# Patient Record
Sex: MALE | Race: WHITE | Employment: FULL TIME | RURAL
[De-identification: names, ages, dates, MRNs, and addresses within clinical notes are randomized per-mention and may not be internally consistent; named-entity substitution may affect disease eponyms.]

---

## 2021-06-30 ENCOUNTER — HOSPITAL ENCOUNTER (EMERGENCY)
Age: 41
Discharge: HOME | End: 2021-06-30
Attending: EMERGENCY MEDICINE
Payer: MEDICAID

## 2021-06-30 VITALS
RESPIRATION RATE: 20 BRPM | HEIGHT: 67 IN | DIASTOLIC BLOOD PRESSURE: 79 MMHG | HEART RATE: 67 BPM | TEMPERATURE: 97.9 F | SYSTOLIC BLOOD PRESSURE: 140 MMHG | WEIGHT: 176 LBS | OXYGEN SATURATION: 98 % | BODY MASS INDEX: 27.62 KG/M2

## 2021-06-30 DIAGNOSIS — S05.01XA ABRASION OF RIGHT CORNEA, INITIAL ENCOUNTER: ICD-10-CM

## 2021-06-30 DIAGNOSIS — S01.81XA FACIAL LACERATION, INITIAL ENCOUNTER: Primary | ICD-10-CM

## 2021-06-30 PROCEDURE — 75810000294 HC INTERM/LAYERED WND RPR

## 2021-06-30 PROCEDURE — 90471 IMMUNIZATION ADMIN: CPT

## 2021-06-30 PROCEDURE — 77030002916 HC SUT ETHLN J&J -A

## 2021-06-30 PROCEDURE — 74011000250 HC RX REV CODE- 250: Performed by: EMERGENCY MEDICINE

## 2021-06-30 PROCEDURE — 74011250636 HC RX REV CODE- 250/636: Performed by: EMERGENCY MEDICINE

## 2021-06-30 PROCEDURE — 99283 EMERGENCY DEPT VISIT LOW MDM: CPT

## 2021-06-30 PROCEDURE — 74011250637 HC RX REV CODE- 250/637: Performed by: EMERGENCY MEDICINE

## 2021-06-30 PROCEDURE — 90715 TDAP VACCINE 7 YRS/> IM: CPT | Performed by: EMERGENCY MEDICINE

## 2021-06-30 PROCEDURE — 77030018842 HC SOL IRR SOD CL 9% BAXT -A

## 2021-06-30 RX ORDER — CEPHALEXIN 500 MG/1
500 CAPSULE ORAL 3 TIMES DAILY
Qty: 15 CAPSULE | Refills: 0 | Status: SHIPPED | OUTPATIENT
Start: 2021-06-30

## 2021-06-30 RX ORDER — CIPROFLOXACIN HYDROCHLORIDE 3.5 MG/ML
1 SOLUTION/ DROPS TOPICAL 4 TIMES DAILY
Qty: 2.5 ML | Refills: 0 | Status: SHIPPED | OUTPATIENT
Start: 2021-06-30 | End: 2021-06-30 | Stop reason: SDUPTHER

## 2021-06-30 RX ORDER — HYDROCODONE BITARTRATE AND ACETAMINOPHEN 5; 325 MG/1; MG/1
1 TABLET ORAL
Status: COMPLETED | OUTPATIENT
Start: 2021-06-30 | End: 2021-06-30

## 2021-06-30 RX ORDER — LIDOCAINE HYDROCHLORIDE AND EPINEPHRINE 10; 10 MG/ML; UG/ML
1.5 INJECTION, SOLUTION INFILTRATION; PERINEURAL ONCE
Status: COMPLETED | OUTPATIENT
Start: 2021-06-30 | End: 2021-06-30

## 2021-06-30 RX ORDER — CIPROFLOXACIN HYDROCHLORIDE 3.5 MG/ML
1 SOLUTION/ DROPS TOPICAL 4 TIMES DAILY
Qty: 5 ML | Refills: 0 | Status: SHIPPED | OUTPATIENT
Start: 2021-06-30 | End: 2021-07-07

## 2021-06-30 RX ADMIN — HYDROCODONE BITARTRATE AND ACETAMINOPHEN 1 TABLET: 5; 325 TABLET ORAL at 14:19

## 2021-06-30 RX ADMIN — LIDOCAINE HYDROCHLORIDE AND EPINEPHRINE 15 MG: 10; 10 INJECTION, SOLUTION INFILTRATION; PERINEURAL at 14:19

## 2021-06-30 RX ADMIN — TETANUS TOXOID, REDUCED DIPHTHERIA TOXOID AND ACELLULAR PERTUSSIS VACCINE, ADSORBED 0.5 ML: 5; 2.5; 8; 8; 2.5 SUSPENSION INTRAMUSCULAR at 14:19

## 2021-06-30 RX ADMIN — FLUORESCEIN SODIUM 1 STRIP: 1 STRIP OPHTHALMIC at 14:19

## 2021-06-30 NOTE — ED PROVIDER NOTES
EMERGENCY DEPARTMENT HISTORY AND PHYSICAL EXAM          Date: 6/30/2021  Patient Name: Chad Butler    History of Presenting Illness     Chief Complaint   Patient presents with    Laceration       History Provided By: Patient    HPI: Chad Butler is a 37 y.o. male, without prior history presents ambulatory to the ER complaining of facial injury. He explains he was helping a friend back above boat trailer when the wench broke loose from control circled back and hit him in the right eye and forehead. He had his friend bring him directly to the ER and has not taken anything for his symptoms but is complaining of severe pain. He denies any nausea or vomiting as well as any loss of consciousness but states he did see stars for a couple seconds    PCP: None    Allergies: nkda  Social Hx: -tobacco, -vaping, -EtOH, -Illicit Drugs; There are no other complaints, changes, or physical findings at this time. Past History     Past Medical History:  History reviewed. No pertinent past medical history. Past Surgical History:  History reviewed. No pertinent surgical history. Family History:  History reviewed. No pertinent family history. Social History:  Social History     Tobacco Use    Smoking status: Never Smoker   Substance Use Topics    Alcohol use: Not Currently    Drug use: Not Currently       Allergies:  No Known Allergies      Review of Systems   Review of Systems   Constitutional: Negative for activity change, appetite change, chills, fever and unexpected weight change. HENT: Negative for congestion. Eyes: Negative for photophobia, pain, redness and visual disturbance. Respiratory: Negative for cough and shortness of breath. Cardiovascular: Negative for chest pain. Gastrointestinal: Negative for abdominal pain, diarrhea, nausea and vomiting. Genitourinary: Negative for dysuria. Musculoskeletal: Negative for back pain. Skin: Positive for wound. Negative for rash. Neurological: Negative for headaches. Physical Exam   Physical Exam  Vitals and nursing note reviewed. Constitutional:       Appearance: He is well-developed. He is not diaphoretic. Comments: This is a healthy but anxious appearing middle-aged male with elevated blood pressure, currently in mild to moderate distress with pain   HENT:      Head: Normocephalic and atraumatic. Eyes:      General: No allergic shiner or scleral icterus. Right eye: No foreign body or discharge. Left eye: No discharge. Extraocular Movements: Extraocular movements intact. Right eye: Normal extraocular motion and no nystagmus. Conjunctiva/sclera:      Right eye: Right conjunctiva is injected (Mildly). No hemorrhage. Pupils: Pupils are equal, round, and reactive to light. Cardiovascular:      Rate and Rhythm: Normal rate and regular rhythm. Heart sounds: Normal heart sounds. No murmur heard. Pulmonary:      Effort: Pulmonary effort is normal. No respiratory distress. Breath sounds: Normal breath sounds. No wheezing or rales. Abdominal:      General: Bowel sounds are normal. There is no distension. Palpations: Abdomen is soft. Tenderness: There is no abdominal tenderness. Musculoskeletal:         General: Normal range of motion. Cervical back: Normal range of motion and neck supple. Right lower leg: No edema. Left lower leg: No edema. Skin:     General: Skin is warm and dry. Findings: No rash. Comments: Supraorbital laceration approximately 4 cm that appears deep to bone. There is abrasion extending down over the upper lid and into the right infraorbital region. Neurological:      Mental Status: He is alert and oriented to person, place, and time. Cranial Nerves: No cranial nerve deficit. Motor: No abnormal muscle tone.        Diagnostic Study Results     Labs -   No results found for this or any previous visit (from the past 12 hour(s)). Radiologic Studies -   No orders to display     CT Results  (Last 48 hours)    None        CXR Results  (Last 48 hours)    None            Medical Decision Making   I am the first provider for this patient. I reviewed the vital signs, available nursing notes, past medical history, past surgical history, family history and social history. Vital Signs-Reviewed the patient's vital signs. Patient Vitals for the past 12 hrs:   Temp Pulse Resp BP SpO2   06/30/21 1409 97.9 °F (36.6 °C) 80 20 (!) 170/89 98 %       Pulse Oximetry Analysis - 98% on RA    Records Reviewed: Nursing Notes    Provider Notes (Medical Decision Making):   MDM: [employer] male presents with blunt trauma to the supraorbital region resulting in laceration with underlying hematoma. Patient is alert and oriented with normal neurologic exam and without LOC. At this time I do not feel CT is warranted but he will require washout, fluorescein exam with laceration repair. ED Course:   Initial assessment performed. The patients presenting problems have been discussed, and they are in agreement with the care plan formulated and outlined with them. I have encouraged them to ask questions as they arise throughout their visit. PROGRESS NOTE:  Procedure Note - Wood's lamp exam:  2:30 PM  Performed by: Clarence Maurice MD   Pts right eye was anesthetized with tetracaine, stained with fluorescein, and examined with a Wood's lamp, using lid eversion. Foreign body: no  Fluorescein uptake: yes, showing corneal abrasion. Negative Yovana sign  The procedure took 1-15 minutes, and pt tolerated well. Procedure Note - Laceration Repair:  3:03 PM  Procedure by Clarence Maurice MD .  Complexity: Moderate  4cm linear laceration to face  was irrigated copiously with NS under jet lavage, prepped with Betadine and draped in a sterile fashion. The area was anesthetized with 6 mLs of  Lidocaine 1% with epinephrine via local infiltration. The wound was explored with the following results: Foreign bodies noted but couldn't be removed. The wound was repaired with Two layer suture closure: Subcutaneous Layer:  1 sutures placed, stitch type:simple interrupted, suture: 4-0 Vicryl Rapide. Skin Layer:  12 sutures placed, stitch type:running, suture: 4-0 Vicryl Rapide. skin layer: 1 suture placed along the stellate portion of the laceration using 4-0 Vicryl via simple interrupted method. The wound was closed with good hemostasis and approximation. Sterile dressing applied. Estimated blood loss: 5ml  The procedure took 31-45 minutes, and pt tolerated well. Discharge note:  Pt appropriate for discharge. Will follow up as instructed. All questions have been answered, pt voiced understanding and agreement with plan. Abx were prescribed, pt advised that diarrhea and rash are possible side effects of the medications. Specific return precautions provided as well as instructions to return to the ED should sx worsen at any time. Vital signs stable for discharge. Critical Care Time:   0      Diagnosis     Clinical Impression:   1. Facial laceration, initial encounter    2. Abrasion of right cornea, initial encounter        PLAN:  1. Current Discharge Medication List      START taking these medications    Details   cephALEXin (Keflex) 500 mg capsule Take 1 Capsule by mouth three (3) times daily. Qty: 15 Capsule, Refills: 0  Start date: 6/30/2021      ciprofloxacin HCl (Ciloxan) 0.3 % ophthalmic solution Apply 1 Drop to eye four (4) times daily. Qty: 2.5 mL, Refills: 0  Start date: 6/30/2021           2. Follow-up Information     Follow up With Specialties Details Why Contact Info    [address] [place] [address] Emergency Medicine  If symptoms worsen [address] [place] [address] [phone]        Return to ED if worse     Disposition:  Home       Please note, this dictation was completed with [employer], the [employer] voice recognition software. Quite often unanticipated grammatical, syntax, homophones, and other interpretive errors are inadvertently transcribed by the computer software. Please disregard these errors. Please excuse any errors that have escaped final proof reading.

## 2021-06-30 NOTE — Clinical Note
[address] [place] [address] EMERGENCY DEP  [address] [place] [address] Dr Vick Cochran [id number]  [phone]    Work/School Note    Date: 6/30/2021    To Whom It May concern:    Ana Ayala was seen and treated today in the emergency room by the following provider(s):  Attending Provider: Trace Rodrigez MD.      Ana Ayala is excused from work/school on 6/30/2021 through 7/3/2021. He is medically clear to return to work/school on 7/4/2021. Sincerely,          Darryle Pare.  MD Cherie

## 2021-06-30 NOTE — ED NOTES
Discharge instructions are reviewed with patient who denies questions or concerns. Leaves amb to care of coworker.